# Patient Record
(demographics unavailable — no encounter records)

---

## 2024-10-15 NOTE — HISTORY OF PRESENT ILLNESS
[FreeTextEntry1] : 44 y/o male here today for urological check up The patient states   Denies nocturia, hematuria, dysuria, flank pain, or any other urinary issues MUSHTAQ  Denies eating spicy, citrus, chocolate. Adequately hydrating   FHx of PCA:  Tobacco use:  Last PSA: 0.57 ng/mL (10/07/2024) Last Creatinine:  0.96 mg/dL (10/07/2024) Last UCx: N/A Uro Meds: none

## 2025-01-14 NOTE — HISTORY OF PRESENT ILLNESS
[FreeTextEntry1] : 46yo M presenting to office with complaints of low sex drive, erectile dysfunction, and fatigue for the last 1-2 years. He reports prior to this his sex drive and erectile function was excellent. Now he has no interest in sex. Reports erections take a long time to obtain and are not fully adequete for sex. Also has premature ejaculation. He was started on Cialis 5mg daily for erectile function but does not think it has helped much. Reports having fatigue all day. No issues sleeping. Does report losing 130lbs in the last 2 years with diet and exercise.   He does not have records with him but showed some recent lab results on phone. He reports seeing an endocrinologist 6 months ago who found him to have low testosterone. He was prescribed "patches" however his insurance did not cover them so he never started. Has not followed up with that endocrinologist due to a many month waiting period for an appointment.  Also reports having chronic "low blood counts" but states he has only been told to increase iron in the diet. The lab results he has on his phone all showed Hg of at least 15.  He did see a therapist for these issues but did not see an progress so stopped. Denied depression.  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   Denies family history of urologic issues. Nonsmoker.

## 2025-01-14 NOTE — PHYSICAL EXAM
[Normal Appearance] : normal appearance [Well Groomed] : well groomed [General Appearance - In No Acute Distress] : no acute distress [Edema] : no peripheral edema [Respiration, Rhythm And Depth] : normal respiratory rhythm and effort [Exaggerated Use Of Accessory Muscles For Inspiration] : no accessory muscle use [Abdomen Soft] : soft [Abdomen Tenderness] : non-tender [Costovertebral Angle Tenderness] : no ~M costovertebral angle tenderness [Penis Abnormality] : normal uncircumcised penis [Urinary Bladder Findings] : the bladder was normal on palpation [Scrotum] : the scrotum was normal [Normal Station and Gait] : the gait and station were normal for the patient's age [] : no rash [No Focal Deficits] : no focal deficits [Oriented To Time, Place, And Person] : oriented to person, place, and time [Affect] : the affect was normal [Mood] : the mood was normal [No Palpable Adenopathy] : no palpable adenopathy [Chaperone Present] : A chaperone was present in the examining room during all aspects of the physical examination [de-identified] : small palpable right varicocele

## 2025-01-14 NOTE — END OF VISIT
[FreeTextEntry3] : Prior to appointment and during encounter with patient, medical records were reviewed including but not limited to, hospital records, outpatient records, imaging results and lab data if available. During this appointment the patient was examined, diagnoses were discussed and explained in a face-to-face manner. In addition, extensive time was spent reviewing aforementioned diagnostic studies. Counseling including test results, differential diagnoses, treatment options, risk and benefits, lifestyle changes, current condition, and current medications was performed. Patient's questions and concerns were addressed. Patient verbalized understanding of the treatment plan. Time spent is for reviewing chart, labs and images if available, counseling and care coordination. [Time Spent: ___ minutes] : I have spent [unfilled] minutes of time on the encounter which excludes teaching and separately reported services.

## 2025-01-14 NOTE — REVIEW OF SYSTEMS
[Negative] : Heme/Lymph [Eyesight Problems] : eyesight problems [Poor quality erections] : Poor quality erections [No erections] : no erections [Anxiety] : anxiety [Muscle Weakness] : muscle weakness [Feelings Of Weakness] : feelings of weakness [see HPI] : see HPI [Recent Weight Loss (___ Lbs)] : recent [unfilled] ~Ulb weight loss

## 2025-01-14 NOTE — ASSESSMENT
[FreeTextEntry1] : 46yo M with nonspecific symptoms of low libido, fatigue and erectile dysfunction  Erectile dysfunction, organic Premature ejaculation - continue Cialis 5mg daily - can take additional 5mg pill prn for sexual activity - discussed trial of different medication such as sildenafil but patient not interested as he likes the aspect of not needing to time sex - discussed addressing the ED is first step in improving PE - if PE is still persistent can trial numbing wipes or oral medications  Low libido, fatigue - unclear etiology - testosterone levels in past borderline low in 400s but levels were not done in the morning - obtain testosterone, free T, TSH, PSA, prolactin, SHBG, LH - advised to get labwork done in the morning - will call with results - if testosterone is low will need to obtain another low level prior to initiating replacement - we briefly discussed replacement options and usual regimen  - will call with results to determine next followup plan

## 2025-02-20 NOTE — HISTORY OF PRESENT ILLNESS
[FreeTextEntry1] : We discussed low serum testosterone and its treatment. Low serum T can result in fatigue, depression, loss of libido, and low of muscle mass and bone mass. Testosterone replacement can be done with topical gel or with IM injection. Gel has the advantage of maintaining steady state in serum, but has the disadvantage of possibly being spread to partners and children with casual contact. The injection has less risk of spread to contacts, but has higher serum T level fluctuation. The Gel is applied daily, whereas the injections are applied every month to every 2 weeks depending on efficacy. We discussed the possible side effects, which include polycythemia, hepatic dysfunction, increased LUTS, exacerbation of MYRTLE, and stimulation of preexisting prostate cancer. As such, pt will undergo screening and routine CBC, LFTs, as well as baseline PSA. We discussed that should pt be found to have hypogonadotropic hypogonadism, clomiphene citrate could be an option to stimulate and increase testosterone production. We discussed that this would be off label. While there is data supporting the use of clomiphene citrate, the data is not as rigorous as a medication with FDA approved indication. This would have the advantages of being oral therapy and would not decrease fertility. Pt understands this.

## 2025-02-27 NOTE — ASSESSMENT
[FreeTextEntry1] : 44yo M with nonspecific symptoms of low libido, fatigue and erectile dysfunction  Erectile dysfunction, organic Premature ejaculation - continue Cialis 5mg daily, refills sent - can take additional 5mg pill prn for sexual activity  Hypogonadism with low libido, fatigue - We discussed that his total testosterone is borderline low normal. His total testosterone is very low. He has significant symptoms consistent with testosterone deficiency. Has tried diet and exercise for two years as well as psychotherapy without improvement in fatigue, sex drive, erectile dysfunction, and mental clarity. Other labs on work up were within normal limits. Recommend testosterone replacement for treatment. - Discussed possible has empty sella syndrome from prior head trauma although LH level was in normal range. Would still recommend T replacement for his symptoms as recombinant GnRH therapy is typically more cumbersome and more expensive. He understands.  - We discussed he would be infertile while on T replacement he understands. He and his wife do not want children.   We discussed low serum testosterone and its treatment. Low serum T can result in fatigue, depression, loss of libido, and low of muscle mass and bone mass. Testosterone replacement can be done with topical gel or with IM injection. Gel has the advantage of maintaining steady state in serum, but has the disadvantage of possibly being spread to partners and children with casual contact. The injection has less risk of spread to contacts, but has higher serum T level fluctuation. The Gel is applied daily, whereas the injections are applied every month to every 2 weeks depending on efficacy. We discussed the possible side effects, which include polycythemia, hepatic dysfunction, increased LUTS, exacerbation of MYRTLE, and stimulation of preexisting prostate cancer. Pt understands this. - start testosterone cypionate 200mg q2 weeks - will obtain repeat labs after third injection  Follow-up appointment in 2 months for continued management of hypogonadism, erectile dysfunction Acute heart failure, unspecified heart failure type

## 2025-02-27 NOTE — REVIEW OF SYSTEMS
[Recent Weight Loss (___ Lbs)] : recent [unfilled] ~Ulb weight loss [Eyesight Problems] : eyesight problems [Poor quality erections] : Poor quality erections [No erections] : no erections [Anxiety] : anxiety [see HPI] : see HPI [Muscle Weakness] : muscle weakness [Feelings Of Weakness] : feelings of weakness [Negative] : Heme/Lymph

## 2025-02-27 NOTE — HISTORY OF PRESENT ILLNESS
[FreeTextEntry1] : 46yo M presenting to office with complaints of low sex drive, erectile dysfunction, and fatigue for the last 1-2 years. He reports prior to this his sex drive and erectile function was excellent. Now he has no interest in sex. Reports erections take a long time to obtain and are not fully adequete for sex. Also has premature ejaculation. He was started on Cialis 5mg daily for erectile function but does not think it has helped much. Reports having fatigue all day. No issues sleeping. Does report losing 130lbs in the last 2 years with diet and exercise.   He does not have records with him but showed some recent lab results on phone. He reports seeing an endocrinologist 6 months ago who found him to have low testosterone. He was prescribed "patches" however his insurance did not cover them so he never started. Has not followed up with that endocrinologist due to a many month waiting period for an appointment.  Also reports having chronic "low blood counts" but states he has only been told to increase iron in the diet. The lab results he has on his phone all showed Hg of at least 15.  He did see a therapist for these issues but did not see an progress so stopped. Denied depression.  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   Denies family history of urologic issues. Nonsmoker.   2/27/2025: Pt here for ED f/u, to go over testosterone results and get first testosterone injection. He has a few questions about testosterone replacement. Also has CT scan from last summer of his head showing partially empty sella. He has been taking the Cialis daily with good results. Wants to have more refills. No side effects noted.   Testosterone levels: 1/28/2025: Total: 338 2/15/2025: Total: 409 Free: 1.7

## 2025-07-29 NOTE — HISTORY OF PRESENT ILLNESS
[FreeTextEntry1] : 44yo M presenting to office with complaints of low sex drive, erectile dysfunction, and fatigue for the last 1-2 years. He reports prior to this his sex drive and erectile function was excellent. Now he has no interest in sex. Reports erections take a long time to obtain and are not fully adequete for sex. Also has premature ejaculation. He was started on Cialis 5mg daily for erectile function but does not think it has helped much. Reports having fatigue all day. No issues sleeping. Does report losing 130lbs in the last 2 years with diet and exercise.   He does not have records with him but showed some recent lab results on phone. He reports seeing an endocrinologist 6 months ago who found him to have low testosterone. He was prescribed "patches" however his insurance did not cover them so he never started. Has not followed up with that endocrinologist due to a many month waiting period for an appointment.  Also reports having chronic "low blood counts" but states he has only been told to increase iron in the diet. The lab results he has on his phone all showed Hg of at least 15.  He did see a therapist for these issues but did not see an progress so stopped. Denied depression.  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   Denies family history of urologic issues. Nonsmoker.   2/27/2025: Pt here for ED f/u, to go over testosterone results and get first testosterone injection. He has a few questions about testosterone replacement. Also has CT scan from last summer of his head showing partially empty sella. He has been taking the Cialis daily with good results. Wants to have more refills. No side effects noted.   Testosterone levels: 1/28/2025: Total: 338 2/15/2025: Total: 409 Free: 1.7  07/29/2025: Patient feeling well. Reports the testosterone works well for his sex drive, mood, energy however he crashes usually 2-3 days prior to the next injection. He looses energy, sex drive and becomes more irritable.  Reports the tadalafil is working well also. No side effects.  Did not get new bloodwork done yet.

## 2025-07-29 NOTE — ASSESSMENT
[FreeTextEntry1] : 46yo M with nonspecific symptoms of low libido, fatigue and erectile dysfunction  Erectile dysfunction, organic Premature ejaculation - continue Cialis 5mg daily, refills sent - can take additional 5mg pill prn for sexual activity  Hypogonadism with low libido, fatigue - will stop testosterone cypionate, change to Xyosted 75mg once weekly to improve symptoms from peaks/valleys and get to more of a steady state.  - check T, free T, and H/H   Follow-up appointment in 3 months for continued management of hypogonadism, erectile dysfunction